# Patient Record
Sex: FEMALE | Race: WHITE | NOT HISPANIC OR LATINO | URBAN - METROPOLITAN AREA
[De-identification: names, ages, dates, MRNs, and addresses within clinical notes are randomized per-mention and may not be internally consistent; named-entity substitution may affect disease eponyms.]

---

## 2017-08-15 ENCOUNTER — IMPORTED ENCOUNTER (OUTPATIENT)
Dept: URBAN - METROPOLITAN AREA CLINIC 50 | Facility: CLINIC | Age: 82
End: 2017-08-15

## 2017-08-18 ENCOUNTER — IMPORTED ENCOUNTER (OUTPATIENT)
Dept: URBAN - METROPOLITAN AREA CLINIC 50 | Facility: CLINIC | Age: 82
End: 2017-08-18

## 2017-09-05 ENCOUNTER — IMPORTED ENCOUNTER (OUTPATIENT)
Dept: URBAN - METROPOLITAN AREA CLINIC 50 | Facility: CLINIC | Age: 82
End: 2017-09-05

## 2017-09-26 ENCOUNTER — IMPORTED ENCOUNTER (OUTPATIENT)
Dept: URBAN - METROPOLITAN AREA CLINIC 50 | Facility: CLINIC | Age: 82
End: 2017-09-26

## 2017-10-02 NOTE — PATIENT DISCUSSION
RADHA HARMAN with the patient that the clouded area she is seeing is the PVD. discussed that could have a PPV to remove floaters OD. Should see Dr Sid Fernandez to discuss his thoughts about removing the floaters OD.

## 2017-10-23 NOTE — PATIENT DISCUSSION
Explained to patient that when the vitreous starts to liquefy, it can pull little pieces that cast shadows on the retina and sometimes those floaters are more Opaque which makes VA difficult to see. Pt states that she does feel that is has worsen since 2016 and it affects her driving at night.

## 2017-10-23 NOTE — PATIENT DISCUSSION
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 Macular pucker is usually due to previous posterior vitreous detachment. Most patients with epiretinal membranes do not progress to the point where intervention is needed. Intervention is typically surgical. The patient can be observed carefully with retinal follow-up in a number of months. If the maculopathy progresses, surgery will be considered.

## 2017-10-23 NOTE — PATIENT DISCUSSION
Only treatment is PPv to remove opacities. R & B explained in detail. Surgery is only recc if affecting ADL'S. Usually Opacities will get better in time which can take weeks to months. Pt states she is not impaired by them. Would recc following for now and reassess in 3 months. Stressed to patient this is an Elective proceed not emergent.

## 2017-10-23 NOTE — PATIENT DISCUSSION
Main reason for poor VA is due to cataract NOT ERM. Cataract surgery would greatly improve VA. Follow with Nataly.

## 2017-11-07 ENCOUNTER — IMPORTED ENCOUNTER (OUTPATIENT)
Dept: URBAN - METROPOLITAN AREA CLINIC 50 | Facility: CLINIC | Age: 82
End: 2017-11-07

## 2017-11-07 NOTE — PATIENT DISCUSSION
"""Start Restasis both eyes twice a day.  will send to [facility] for cheaper Rx."""

## 2018-03-27 ENCOUNTER — IMPORTED ENCOUNTER (OUTPATIENT)
Dept: URBAN - METROPOLITAN AREA CLINIC 50 | Facility: CLINIC | Age: 83
End: 2018-03-27

## 2018-03-29 NOTE — PATIENT DISCUSSION
HARMAN options of CV neva vs -2.00. Talked about what is more important to pt. PT reads 5hrs/day, discussed goal of -1.75 to -2.00 CV and advised pt will need DVOs for night driving. Pt understands and elects CV  Goal -1.75 to -2.00.

## 2018-03-30 NOTE — PATIENT DISCUSSION
On POD #1, the patient is doing well. Post-op cataract sheet was given and reviewed. Instructed to continue antibiotic drops for 7 days, steroid drops 2 times a day for 2 weeks, then 1 times a day for 2 weeks and NSAID drops for 4 weeks. No swimming for 2 weeks. Contact immediately for decreased vision, red eye or pain. Advised against heavy lifting, prolonged bending, or straining. No make up/showers/water to the eye. Emergency phone numbers given for patient to call at any time.

## 2018-05-17 ENCOUNTER — IMPORTED ENCOUNTER (OUTPATIENT)
Dept: URBAN - METROPOLITAN AREA CLINIC 50 | Facility: CLINIC | Age: 83
End: 2018-05-17

## 2018-05-24 NOTE — PATIENT DISCUSSION
Mild to Moderate ERM, No change since last exam 6 mo, No distortion, Patient is happy with current vision, recommend refraction to sharpen visual acuity.

## 2018-08-22 ENCOUNTER — IMPORTED ENCOUNTER (OUTPATIENT)
Dept: URBAN - METROPOLITAN AREA CLINIC 50 | Facility: CLINIC | Age: 83
End: 2018-08-22

## 2018-08-23 ENCOUNTER — IMPORTED ENCOUNTER (OUTPATIENT)
Dept: URBAN - METROPOLITAN AREA CLINIC 50 | Facility: CLINIC | Age: 83
End: 2018-08-23

## 2018-08-27 ENCOUNTER — IMPORTED ENCOUNTER (OUTPATIENT)
Dept: URBAN - METROPOLITAN AREA CLINIC 50 | Facility: CLINIC | Age: 83
End: 2018-08-27

## 2018-09-19 ENCOUNTER — IMPORTED ENCOUNTER (OUTPATIENT)
Dept: URBAN - METROPOLITAN AREA CLINIC 50 | Facility: CLINIC | Age: 83
End: 2018-09-19

## 2018-10-29 ENCOUNTER — IMPORTED ENCOUNTER (OUTPATIENT)
Dept: URBAN - METROPOLITAN AREA CLINIC 50 | Facility: CLINIC | Age: 83
End: 2018-10-29

## 2018-12-31 ENCOUNTER — IMPORTED ENCOUNTER (OUTPATIENT)
Dept: URBAN - METROPOLITAN AREA CLINIC 50 | Facility: CLINIC | Age: 83
End: 2018-12-31

## 2019-01-02 ENCOUNTER — IMPORTED ENCOUNTER (OUTPATIENT)
Dept: URBAN - METROPOLITAN AREA CLINIC 50 | Facility: CLINIC | Age: 84
End: 2019-01-02

## 2019-01-02 NOTE — PATIENT DISCUSSION
"""Start Warm compresses both eyes twice a day ."" ""Start Erythromycin Ointment both eyes at bedtime . "" ""Start Lid Scrubs both eyes twice a day . """

## 2019-01-28 ENCOUNTER — IMPORTED ENCOUNTER (OUTPATIENT)
Dept: URBAN - METROPOLITAN AREA CLINIC 50 | Facility: CLINIC | Age: 84
End: 2019-01-28

## 2019-05-19 NOTE — PATIENT DISCUSSION
Only treatment is PPv to remove opacities. R & B explained in detail. Surgery is only recc if affecting ADL'S. Usually Opacities will get better in time which can take weeks to months. Pt states she is not impaired by them. Would recc following for now and reassess in 3 months. Stressed to patient this is an Elective proceed not emergent. .

## 2019-07-25 ENCOUNTER — IMPORTED ENCOUNTER (OUTPATIENT)
Dept: URBAN - METROPOLITAN AREA CLINIC 50 | Facility: CLINIC | Age: 84
End: 2019-07-25

## 2019-07-29 ENCOUNTER — IMPORTED ENCOUNTER (OUTPATIENT)
Dept: URBAN - METROPOLITAN AREA CLINIC 50 | Facility: CLINIC | Age: 84
End: 2019-07-29

## 2019-10-24 ENCOUNTER — IMPORTED ENCOUNTER (OUTPATIENT)
Dept: URBAN - METROPOLITAN AREA CLINIC 50 | Facility: CLINIC | Age: 84
End: 2019-10-24

## 2019-11-25 ENCOUNTER — IMPORTED ENCOUNTER (OUTPATIENT)
Dept: URBAN - METROPOLITAN AREA CLINIC 50 | Facility: CLINIC | Age: 84
End: 2019-11-25

## 2020-04-21 NOTE — PATIENT DISCUSSION
HARMAN options of CV neva vs -2.00. Talked about what is more important to pt. PT reads 5hrs/day, discussed goal of -1.75 to -2.00 CV and advised pt will need DVOs for night driving. Pt understands and elects CV  Goal -1.75 to -2.00. - Recommend continue current insulin doses of Lantus 10 units QHS and Humalog 3 units TID AC and low SSI AC and HS  - If Prednisone 15 mg dose is changed, please notify our team as she will need less insulin doses as well  - If glucose is less than 100 pre-meal, can HOLD pre-meal Humalog 3 units given oral candidiasis and poor PO intake. Do not hold Humalog scale    Outpatient plan  - If no contraindication, possibly only oral agents or possibly basal + oral based on PO intake and if noted to have hyperglycemia. A1c is 14%. Will need insulin refresher teaching by RN since -400 on admission  - F/u Endocrinology with [facility] at [address], call [phone] for apt

## 2020-08-17 ENCOUNTER — IMPORTED ENCOUNTER (OUTPATIENT)
Dept: URBAN - METROPOLITAN AREA CLINIC 50 | Facility: CLINIC | Age: 85
End: 2020-08-17

## 2020-11-30 NOTE — PATIENT DISCUSSION
Artificial Tears: One drop to both eyes 3-4 times daily. We recommend Systane or Refresh lubricating eye drops which can be found at any pharmacy. Zyclara Counseling:  I discussed with the patient the risks of imiquimod including but not limited to erythema, scaling, itching, weeping, crusting, and pain.  Patient understands that the inflammatory response to imiquimod is variable from person to person and was educated regarded proper titration schedule.  If flu-like symptoms develop, patient knows to discontinue the medication and contact us.

## 2021-01-22 ENCOUNTER — PREPPED CHART (OUTPATIENT)
Dept: URBAN - METROPOLITAN AREA CLINIC 49 | Facility: CLINIC | Age: 86
End: 2021-01-22

## 2021-01-22 ENCOUNTER — IMPORTED ENCOUNTER (OUTPATIENT)
Dept: URBAN - METROPOLITAN AREA CLINIC 50 | Facility: CLINIC | Age: 86
End: 2021-01-22

## 2021-01-22 NOTE — PATIENT DISCUSSION
"""Monitor ERM for changes. Informed patient of potential for worsening. Instructed patient to call with changes in vision. Recommend regular Amsler grid checks.  """ Mcadams

## 2021-01-22 NOTE — PROCEDURE NOTE: CLINICAL
Consent was obtained prior to the procedure. Patient was brought to slit lamp where trichiasis was/were removed using micro forceps. Patient tolerated procedure well.

## 2021-04-09 ASSESSMENT — VISUAL ACUITY
OS_CC: 20/25-1
OD_GLARE: 20/40
OD_GLARE: 20/40
OS_GLARE: 20/40
OS_GLARE: 20/40

## 2021-04-09 ASSESSMENT — TONOMETRY
OS_IOP_MMHG: 15
OD_IOP_MMHG: 15

## 2021-04-17 ASSESSMENT — VISUAL ACUITY
OD_BAT: 20/40
OS_PH: 20/40+1
OD_CC: 20/30-1
OD_CC: 20/30-2
OD_CC: 20/40-2
OS_BAT: 20/40
OD_OTHER: 20/200.
OS_OTHER: 20/40. 20/40.
OD_CC: J1+@ 14 IN
OS_PH: 20/30-2
OS_CC: 20/50
OS_CC: J1-
OD_BAT: 20/200
OD_CC: 20/30-2
OS_CC: J1
OS_CC: 20/25
OS_CC: J1+@ 14 IN
OS_CC: 20/30
OS_BAT: 20/70
OD_CC: J2
OD_CC: 20/200
OD_CC: 20/40
OS_CC: 20/40
OD_PH: 20/30
OD_CC: 20/40-
OS_CC: J1@ 16 IN
OS_PH: 20/40-2
OS_CC: 20/30-
OS_CC: 20/40-2
OS_CC: 20/30-
OS_CC: J2
OD_CC: J1
OS_CC: J1+
OS_BAT: 20/40
OD_CC: 20/200
OD_CC: 20/50-1
OS_CC: 20/40-2
OD_CC: 20/25-1
OD_CC: 20/30
OS_CC: 20/25-1
OS_OTHER: 20/70.
OS_CC: 20/40-1
OD_CC: 20/40
OD_OTHER: 20/40. 20/40.
OD_PH: 20/40-2
OD_OTHER: 20/40. 20/60.
OD_CC: 20/50
OS_OTHER: 20/40. 20/60.
OD_CC: 20/50
OD_CC: J1+
OS_CC: 20/25-
OD_CC: J1-
OD_CC: J1@ 16 IN
OD_PH: 20/30
OD_BAT: 20/40
OS_CC: 20/50
OD_CC: 20/25
OS_CC: 20/30
OS_CC: 20/40
OS_CC: 20/25

## 2021-04-17 ASSESSMENT — TONOMETRY
OD_IOP_MMHG: 14
OS_IOP_MMHG: 13
OS_IOP_MMHG: 16
OD_IOP_MMHG: 14
OS_IOP_MMHG: 16
OS_IOP_MMHG: 12
OS_IOP_MMHG: 14
OD_IOP_MMHG: 12
OS_IOP_MMHG: 14
OD_IOP_MMHG: 15
OS_IOP_MMHG: 16
OS_IOP_MMHG: 12
OD_IOP_MMHG: 13
OD_IOP_MMHG: 10
OS_IOP_MMHG: 15
OD_IOP_MMHG: 12
OD_IOP_MMHG: 16
OD_IOP_MMHG: 12
OS_IOP_MMHG: 12
OS_IOP_MMHG: 11
OD_IOP_MMHG: 15
OS_IOP_MMHG: 17
OD_IOP_MMHG: 14
OD_IOP_MMHG: 14
OS_IOP_MMHG: 10

## 2021-04-19 ENCOUNTER — FOLLOW UP (OUTPATIENT)
Dept: URBAN - METROPOLITAN AREA CLINIC 49 | Facility: CLINIC | Age: 86
End: 2021-04-19

## 2021-04-19 DIAGNOSIS — H40.20X0: ICD-10-CM

## 2021-04-19 DIAGNOSIS — H40.1131: ICD-10-CM

## 2021-04-19 PROCEDURE — 92012 INTRM OPH EXAM EST PATIENT: CPT

## 2021-04-19 PROCEDURE — 92083 EXTENDED VISUAL FIELD XM: CPT

## 2021-04-19 PROCEDURE — 92133 CPTRZD OPH DX IMG PST SGM ON: CPT

## 2021-04-19 ASSESSMENT — VISUAL ACUITY
OD_SC: 20/50
OS_SC: 20/40
OS_PH: 20/30

## 2021-04-19 ASSESSMENT — TONOMETRY
OD_IOP_MMHG: 13
OS_IOP_MMHG: 13

## 2022-02-14 ENCOUNTER — COMPREHENSIVE EXAM (OUTPATIENT)
Dept: URBAN - METROPOLITAN AREA CLINIC 49 | Facility: CLINIC | Age: 87
End: 2022-02-14

## 2022-02-14 DIAGNOSIS — D31.32: ICD-10-CM

## 2022-02-14 DIAGNOSIS — H16.223: ICD-10-CM

## 2022-02-14 DIAGNOSIS — H26.492: ICD-10-CM

## 2022-02-14 DIAGNOSIS — H35.373: ICD-10-CM

## 2022-02-14 DIAGNOSIS — H40.1131: ICD-10-CM

## 2022-02-14 DIAGNOSIS — H43.813: ICD-10-CM

## 2022-02-14 PROCEDURE — 92134 CPTRZ OPH DX IMG PST SGM RTA: CPT

## 2022-02-14 PROCEDURE — 92014 COMPRE OPH EXAM EST PT 1/>: CPT

## 2022-02-14 PROCEDURE — 76514 ECHO EXAM OF EYE THICKNESS: CPT

## 2022-02-14 RX ORDER — FLUOROMETHOLONE ACETATE 1 MG/ML: 1 SUSPENSION/ DROPS OPHTHALMIC TWICE A DAY

## 2022-02-14 RX ORDER — LIFITEGRAST 50 MG/ML: 1 SOLUTION/ DROPS OPHTHALMIC TWICE A DAY

## 2022-02-14 ASSESSMENT — TONOMETRY
OD_IOP_MMHG: 19
OS_IOP_MMHG: 15
OS_IOP_MMHG: 18
OD_IOP_MMHG: 15

## 2022-02-14 ASSESSMENT — PACHYMETRY
OD_CT_UM: 480
OS_CT_UM: 504

## 2022-02-14 ASSESSMENT — VISUAL ACUITY
OU_CC: J2
OS_CC: 20/80
OD_SC: 20/70
OS_SC: 20/40

## 2022-02-14 NOTE — PATIENT DISCUSSION
Large Nevus seen temporally on exam today with irregular pigment. Refer to [facility] for further treatment.

## 2022-06-16 ENCOUNTER — DIAGNOSTICS ONLY (OUTPATIENT)
Dept: URBAN - METROPOLITAN AREA CLINIC 49 | Facility: CLINIC | Age: 87
End: 2022-06-16

## 2022-06-16 DIAGNOSIS — H40.1131: ICD-10-CM

## 2022-06-16 PROCEDURE — 92083 EXTENDED VISUAL FIELD XM: CPT

## 2022-06-16 NOTE — PATIENT DISCUSSION
OCT not completed today. HVF (06/2022) inf paracentral missed points OD, in/nasal defect (worsened) OS. Recommend repeat HVF OS and need updated OCT RNFL.  Due to patient's age and history of poor fixation, recommend graciela-fast.

## 2022-06-16 NOTE — PATIENT DISCUSSION
Large Nevus seen temporally on exam today with irregular pigment. Refer to [facility] [place] [facility] for further treatment.

## 2022-06-28 ENCOUNTER — DIAGNOSTICS ONLY (OUTPATIENT)
Dept: URBAN - METROPOLITAN AREA CLINIC 49 | Facility: CLINIC | Age: 87
End: 2022-06-28

## 2022-06-28 DIAGNOSIS — H40.1131: ICD-10-CM

## 2022-06-28 PROCEDURE — 92133 CPTRZD OPH DX IMG PST SGM ON: CPT

## 2022-06-28 PROCEDURE — 92083 EXTENDED VISUAL FIELD XM: CPT

## 2022-06-28 NOTE — PATIENT DISCUSSION
OCT RNFL (06/2022) WNL OU. HVF (06/2022) repeat, unreliable OS, improved from previous, non repeatable and does not correlate with OCT RNFL. Will call patient with results.

## 2022-06-28 NOTE — PATIENT DISCUSSION
Large Nevus seen temporally on exam today with irregular pigment. Refer to [facility] for further treatment.

## 2022-08-11 ENCOUNTER — FOLLOW UP (OUTPATIENT)
Dept: URBAN - METROPOLITAN AREA CLINIC 49 | Facility: CLINIC | Age: 87
End: 2022-08-11

## 2022-08-11 DIAGNOSIS — H16.223: ICD-10-CM

## 2022-08-11 DIAGNOSIS — H40.1131: ICD-10-CM

## 2022-08-11 PROCEDURE — 92012 INTRM OPH EXAM EST PATIENT: CPT

## 2022-08-11 RX ORDER — LATANOPROST 50 UG/ML: 1 SOLUTION/ DROPS OPHTHALMIC EVERY EVENING

## 2022-08-11 ASSESSMENT — TONOMETRY
OD_IOP_MMHG: 12
OD_IOP_MMHG: 16
OS_IOP_MMHG: 14
OS_IOP_MMHG: 11

## 2022-08-11 ASSESSMENT — VISUAL ACUITY
OD_SC: 20/50-2
OS_SC: 20/40-2

## 2022-08-11 NOTE — PATIENT DISCUSSION
Patients care giver puts her drops in, gave patient a print out to give to care giver to make sure she is taking the right drops.

## 2022-08-11 NOTE — PATIENT DISCUSSION
Large Nevus seen temporally on exam today with irregular pigment. Refer to [facility] for further treatment.

## 2022-08-11 NOTE — PATIENT DISCUSSION
OCT RNFL (06/2022) WNL OU. HVF (06/2022) repeat, unreliable OS, improved from previous, non repeatable and does not correlate with OCT RNFL.

## 2023-01-03 ENCOUNTER — COMPREHENSIVE EXAM (OUTPATIENT)
Dept: URBAN - METROPOLITAN AREA CLINIC 49 | Facility: CLINIC | Age: 88
End: 2023-01-03

## 2023-01-03 DIAGNOSIS — H43.813: ICD-10-CM

## 2023-01-03 DIAGNOSIS — H16.223: ICD-10-CM

## 2023-01-03 DIAGNOSIS — D31.32: ICD-10-CM

## 2023-01-03 DIAGNOSIS — H35.373: ICD-10-CM

## 2023-01-03 DIAGNOSIS — H40.1131: ICD-10-CM

## 2023-01-03 DIAGNOSIS — H02.054: ICD-10-CM

## 2023-01-03 PROCEDURE — 92014 COMPRE OPH EXAM EST PT 1/>: CPT

## 2023-01-03 PROCEDURE — 92134 CPTRZ OPH DX IMG PST SGM RTA: CPT

## 2023-01-03 ASSESSMENT — VISUAL ACUITY
OD_CC: J1
OS_GLARE: 20/60
OU_CC: J1
OD_SC: 20/30
OD_GLARE: 20/60
OU_SC: 20/30
OS_CC: J2
OS_GLARE: 20/70
OD_GLARE: 20/40
OS_SC: 20/40

## 2023-01-03 ASSESSMENT — TONOMETRY
OD_IOP_MMHG: 13
OD_IOP_MMHG: 17
OS_IOP_MMHG: 13
OS_IOP_MMHG: 16

## 2023-04-07 ENCOUNTER — ESTABLISHED PATIENT (OUTPATIENT)
Dept: URBAN - METROPOLITAN AREA CLINIC 49 | Facility: CLINIC | Age: 88
End: 2023-04-07

## 2023-04-07 DIAGNOSIS — H02.054: ICD-10-CM

## 2023-04-07 DIAGNOSIS — H01.006: ICD-10-CM

## 2023-04-07 DIAGNOSIS — H01.003: ICD-10-CM

## 2023-04-07 DIAGNOSIS — H52.4: ICD-10-CM

## 2023-04-07 DIAGNOSIS — H02.002: ICD-10-CM

## 2023-04-07 DIAGNOSIS — H40.20X0: ICD-10-CM

## 2023-04-07 PROCEDURE — 67820 REVISE EYELASHES: CPT

## 2023-04-07 PROCEDURE — 92012 INTRM OPH EXAM EST PATIENT: CPT

## 2023-04-07 PROCEDURE — 92015 DETERMINE REFRACTIVE STATE: CPT

## 2023-04-07 ASSESSMENT — TONOMETRY
OD_IOP_MMHG: 18
OS_IOP_MMHG: 17
OD_IOP_MMHG: 14
OS_IOP_MMHG: 14

## 2023-04-07 ASSESSMENT — VISUAL ACUITY
OS_SC: 20/40-2
OD_SC: 20/50
OS_PH: 20/40
OU_CC: J1

## 2023-04-27 NOTE — PATIENT DISCUSSION
Explained that as an aging change the vitreous starts to liquefy and pull away. Which causes # 2. Add [id number] To Bill?: No

## 2023-07-06 ENCOUNTER — FOLLOW UP (OUTPATIENT)
Dept: URBAN - METROPOLITAN AREA CLINIC 49 | Facility: CLINIC | Age: 88
End: 2023-07-06

## 2023-07-06 DIAGNOSIS — H40.1131: ICD-10-CM

## 2023-07-06 PROCEDURE — 92083 EXTENDED VISUAL FIELD XM: CPT

## 2023-07-06 PROCEDURE — 92012 INTRM OPH EXAM EST PATIENT: CPT

## 2023-07-06 PROCEDURE — 92133 CPTRZD OPH DX IMG PST SGM ON: CPT

## 2023-07-06 ASSESSMENT — TONOMETRY
OS_IOP_MMHG: 14
OS_IOP_MMHG: 17
OD_IOP_MMHG: 18
OD_IOP_MMHG: 14

## 2023-07-06 ASSESSMENT — VISUAL ACUITY
OD_PH: 20/40
OS_SC: 20/40
OD_SC: 20/50

## 2024-01-11 ENCOUNTER — COMPREHENSIVE EXAM (OUTPATIENT)
Dept: URBAN - METROPOLITAN AREA CLINIC 49 | Facility: LOCATION | Age: 89
End: 2024-01-11

## 2024-01-11 DIAGNOSIS — H02.002: ICD-10-CM

## 2024-01-11 DIAGNOSIS — H43.813: ICD-10-CM

## 2024-01-11 DIAGNOSIS — H35.373: ICD-10-CM

## 2024-01-11 DIAGNOSIS — H16.223: ICD-10-CM

## 2024-01-11 DIAGNOSIS — H40.1131: ICD-10-CM

## 2024-01-11 DIAGNOSIS — H26.493: ICD-10-CM

## 2024-01-11 DIAGNOSIS — D31.32: ICD-10-CM

## 2024-01-11 PROCEDURE — 92134 CPTRZ OPH DX IMG PST SGM RTA: CPT

## 2024-01-11 PROCEDURE — 99214 OFFICE O/P EST MOD 30 MIN: CPT

## 2024-01-11 ASSESSMENT — VISUAL ACUITY
OS_GLARE: 20/70-1
OD_CC: 20/70+1
OU_CC: J1 @ 14IN
OS_GLARE: 20/60-1
OS_CC: 20/40

## 2024-01-11 ASSESSMENT — TONOMETRY
OS_IOP_MMHG: 18
OD_IOP_MMHG: 15
OD_IOP_MMHG: 19
OS_IOP_MMHG: 15

## 2024-06-27 ENCOUNTER — FOLLOW UP (OUTPATIENT)
Dept: URBAN - METROPOLITAN AREA CLINIC 49 | Facility: LOCATION | Age: 89
End: 2024-06-27

## 2024-06-27 DIAGNOSIS — H26.493: ICD-10-CM

## 2024-06-27 DIAGNOSIS — H16.223: ICD-10-CM

## 2024-06-27 DIAGNOSIS — H40.1131: ICD-10-CM

## 2024-06-27 PROCEDURE — 99213 OFFICE O/P EST LOW 20 MIN: CPT

## 2024-06-27 PROCEDURE — 92133 CPTRZD OPH DX IMG PST SGM ON: CPT

## 2024-06-27 ASSESSMENT — VISUAL ACUITY
OS_CC: 20/50+2
OD_CC: 20/70

## 2024-06-27 ASSESSMENT — TONOMETRY
OD_IOP_MMHG: 14
OS_IOP_MMHG: 14
OS_IOP_MMHG: 17
OD_IOP_MMHG: 18

## 2024-07-03 ENCOUNTER — DIAGNOSTICS ONLY (OUTPATIENT)
Dept: URBAN - METROPOLITAN AREA CLINIC 49 | Facility: LOCATION | Age: 89
End: 2024-07-03

## 2024-07-03 DIAGNOSIS — H40.1131: ICD-10-CM

## 2024-07-03 PROCEDURE — 92082 INTERMEDIATE VISUAL FIELD XM: CPT

## 2025-01-16 ENCOUNTER — DIAGNOSTICS ONLY (OUTPATIENT)
Age: OVER 89
End: 2025-01-16

## 2025-01-16 DIAGNOSIS — H40.1131: ICD-10-CM

## 2025-01-16 PROCEDURE — 92133 CPTRZD OPH DX IMG PST SGM ON: CPT

## 2025-01-16 PROCEDURE — 92083 EXTENDED VISUAL FIELD XM: CPT

## 2025-02-12 ENCOUNTER — COMPREHENSIVE EXAM (OUTPATIENT)
Age: OVER 89
End: 2025-02-12

## 2025-02-12 DIAGNOSIS — H52.4: ICD-10-CM

## 2025-02-12 DIAGNOSIS — H35.373: ICD-10-CM

## 2025-02-12 DIAGNOSIS — H40.1131: ICD-10-CM

## 2025-02-12 DIAGNOSIS — H26.493: ICD-10-CM

## 2025-02-12 DIAGNOSIS — D31.32: ICD-10-CM

## 2025-02-12 PROCEDURE — 92015 DETERMINE REFRACTIVE STATE: CPT

## 2025-02-12 PROCEDURE — 99214 OFFICE O/P EST MOD 30 MIN: CPT
